# Patient Record
Sex: MALE | Race: WHITE
[De-identification: names, ages, dates, MRNs, and addresses within clinical notes are randomized per-mention and may not be internally consistent; named-entity substitution may affect disease eponyms.]

---

## 2018-03-03 ENCOUNTER — HOSPITAL ENCOUNTER (EMERGENCY)
Dept: HOSPITAL 76 - ED | Age: 53
Discharge: HOME | End: 2018-03-03
Payer: COMMERCIAL

## 2018-03-03 VITALS — DIASTOLIC BLOOD PRESSURE: 88 MMHG | SYSTOLIC BLOOD PRESSURE: 116 MMHG

## 2018-03-03 DIAGNOSIS — T78.40XA: Primary | ICD-10-CM
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
## 2018-03-03 PROCEDURE — 99283 EMERGENCY DEPT VISIT LOW MDM: CPT

## 2018-03-03 NOTE — ED PHYSICIAN DOCUMENTATION
History of Present Illness





- Stated complaint


Stated Complaint: ALLERGIC REACTION





- Chief complaint


Chief Complaint: Allergic Rx





- History obtained from


History obtained from: Patient, Family





- History of Present Illness


Timing: How many hours ago (1)


Pain level max: 0


Pain level now: 0





- Additonal information


Additional information: 





States [place] [facility] [place], when he arrived home, he felt itchy and broke 

out in a generalized rash.  No dyspnea or wheezing. 





Review of Systems


Ten Systems: 10 systems reviewed and negative


Constitutional: denies: Fever, Chills


Ears: denies: Ear pain


Nose: denies: Rhinorrhea / runny nose, Congestion


Throat: denies: Sore throat


Cardiac: denies: Chest pain / pressure


Respiratory: reports: Wheezing.  denies: Cough


GI: denies: Abdominal Pain, Nausea, Vomiting, Diarrhea


Musculoskeletal: denies: Neck pain, Back pain


Neurologic: denies: Headache





PD PAST MEDICAL HISTORY





- Past Medical History


Past Medical History: No





- Past Surgical History


Past Surgical History: No





- Present Medications


Home Medications: 


 Ambulatory Orders











 Medication  Instructions  Recorded  Confirmed


 


predniSONE [Prednisone] 40 mg PO DAILY #10 tablet 03/03/18 














- Allergies


Allergies/Adverse Reactions: 


 Allergies











Allergy/AdvReac Type Severity Reaction Status Date / Time


 


No Known Drug Allergies Allergy   Verified 08/11/16 19:27














- Social History


Does the pt smoke?: No


Smoking Status: Never smoker


Does the pt drink ETOH?: Yes





- Immunizations


Immunizations are current?: Yes





PD ED PE NORMAL





- Vitals


Vital signs reviewed: Yes





- General


General: Alert and oriented X 3, No acute distress, Well developed/nourished





- HEENT


HEENT: PERRL, Moist mucous membranes, Pharynx benign





- Neck


Neck: Supple, no meningeal sign





- Cardiac


Cardiac: RRR, Strong equal pulses





- Respiratory


Respiratory: No respiratory distress, Clear bilaterally





- Abdomen


Abdomen: Soft, Non tender, Non distended





- Derm


Derm: Warm and dry, Other (diffuse urticaria, blanches easily.)





- Extremities


Extremities: No edema





- Neuro


Neuro: Alert and oriented X 3





- Psych


Psych: Normal mood, Normal affect





Results





- Vitals


Vitals: 


 Vital Signs - 24 hr











  03/03/18 03/03/18 03/03/18





  18:17 19:45 20:14


 


Temperature   36.8 C


 


Heart Rate 100 80 77


 


Respiratory 18 15 15





Rate   


 


Blood Pressure 157/97 H 122/92 H 116/88 H


 


O2 Saturation 96 91 L 98








 Oxygen











O2 Source                      Room air

















PD MEDICAL DECISION MAKING





- ED course


Complexity details: re-evaluated patient, considered differential, d/w patient


ED course: 


Patient is a 52-year-old gentleman who presents to the emergency department 

with diffuse urticaria.  Unclear etiology, possible shellfish allergy?  Given 

prednisone and Benadryl.  No anaphylaxis.  No wheezing.  No stridor.  Symptoms 

resolved in the emergency department.  Will place on steroids for home and 

follow-up with his doctor.  Patient and family counseled regarding signs and 

symptoms for which I believe and urgent re-evaluation would be necessary. 

Patient with good understanding of and agreement to plan and is comfortable 

going home at this time





This document was made in part using voice recognition software. While efforts 

are made to proofread this document, sound alike and grammatical errors may 

occur.





Departure





- Departure


Disposition: 01 Home, Self Care


Clinical Impression: 


Allergic reaction


Qualifiers:


 Encounter type: initial encounter Qualified Code(s): T78.40XA - Allergy, 

unspecified, initial encounter





Condition: Good


Instructions:  ED Allergic Reaction General Other


Follow-Up: 


Rebekah Regalado MD [Primary Care Provider] - Within 1 week


Prescriptions: 


predniSONE [Prednisone] 40 mg PO DAILY #10 tablet


Comments: 


Return if you worsen.  Continue the prednisone as prescribed.  You can also 

take Benadryl at home as needed for itching.  I would avoid any shellfish until 

you have been allergy tested.


Discharge Date/Time: 03/03/18 20:18

## 2020-10-21 ENCOUNTER — HOSPITAL ENCOUNTER (EMERGENCY)
Dept: HOSPITAL 76 - ED | Age: 55
Discharge: HOME | End: 2020-10-21
Payer: COMMERCIAL

## 2020-10-21 VITALS — DIASTOLIC BLOOD PRESSURE: 78 MMHG | SYSTOLIC BLOOD PRESSURE: 128 MMHG
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
## 2020-10-21 DIAGNOSIS — S68.622A: Primary | ICD-10-CM
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
## 2020-10-21 PROCEDURE — 99282 EMERGENCY DEPT VISIT SF MDM: CPT

## 2020-10-21 NOTE — ED PHYSICIAN DOCUMENTATION
PD HPI UPPER EXT INJURY





- Stated complaint


Stated Complaint: RT FINGER LAC





- Chief complaint


Chief Complaint: Laceration





- History obtained from


History obtained from: Patient (Cut the tip off of his right finger with a 

mandolin slicer at home just prior to arrival.  Tetanus is up-to-date.)





Review of Systems


Constitutional: reports: Reviewed and negative


Eyes: reports: Reviewed and negative


Ears: reports: Reviewed and negative





PD PAST MEDICAL HISTORY





- Past Surgical History


Past Surgical History: No





- Present Medications


Home Medications: 


                                Ambulatory Orders











 Medication  Instructions  Recorded  Confirmed


 


Allopurinol [Zyloprim] 300 mg PO 10/21/20 


 


Bacitracin Zinc Oint 1 applic TOP BID #1 tube 10/21/20 


 


Zolpidem [Ambien] 5 mg PO HS 10/21/20 10/21/20














- Allergies


Allergies/Adverse Reactions: 


                                    Allergies











Allergy/AdvReac Type Severity Reaction Status Date / Time


 


mussels Allergy  Anaphylaxis Verified 10/21/20 16:52














- Social History


Does the pt smoke?: No


Smoking Status: Never smoker


Does the pt drink ETOH?: Yes





- Immunizations


Immunizations are current?: Yes





PD ED PE NORMAL





- Vitals


Vital signs reviewed: Yes





- General


General: Alert and oriented X 3, No acute distress





- Extremities


Extremities: Other (Right middle finger has a less than 1 cm tip amputation 

that does not involve the nail or bone.)





- Neuro


Neuro: Alert and oriented X 3, Normal speech





- Psych


Psych: Normal mood, Normal affect





Results





- Vitals


Vitals: 


                               Vital Signs - 24 hr











  10/21/20 10/21/20





  16:49 17:41


 


Temperature 36.4 C L 


 


Heart Rate 98 74


 


Respiratory 18 14





Rate  


 


Blood Pressure 125/73 128/78


 


O2 Saturation 98 99








                                     Oxygen











O2 Source                      Room air

















PD MEDICAL DECISION MAKING





- ED course


ED course: 





The wound was cleansed and dressed with Gelfoam and tube gauze and there was no 

further bleeding.





Departure





- Departure


Disposition: 01 Home, Self Care


Clinical Impression: 


Traumatic amputation of fingertip


Qualifiers:


 Encounter type: initial encounter Qualified Code(s): S68.119A - Complete 

traumatic metacarpophalangeal amputation of unspecified finger, initial encou

nter





Condition: Good


Record reviewed to determine appropriate education?: Yes


Instructions:  ED Laceration Amputation Finger Tip Open Tx


Prescriptions: 


Bacitracin Zinc Oint 1 applic TOP BID #1 tube


Comments: 


This should heal fine, keep the current dressing on until Friday, at that point 

you can wash it with soap and water and then apply the antibiotic ointment 

prescribed with a bulky dressing and the splint to keep it protected.  You will 

need to do this I suspect for several weeks until it is fully healed.  Follow-up

 with your doctor early next week for a wound check.  Return if worsening.


Discharge Date/Time: 10/21/20 17:41